# Patient Record
Sex: FEMALE | Race: WHITE | NOT HISPANIC OR LATINO | Employment: UNEMPLOYED | URBAN - METROPOLITAN AREA
[De-identification: names, ages, dates, MRNs, and addresses within clinical notes are randomized per-mention and may not be internally consistent; named-entity substitution may affect disease eponyms.]

---

## 2020-07-07 ENCOUNTER — PREP FOR SURGERY (OUTPATIENT)
Dept: OTHER | Facility: HOSPITAL | Age: 40
End: 2020-07-07

## 2020-07-07 ENCOUNTER — OFFICE VISIT (OUTPATIENT)
Dept: OBSTETRICS AND GYNECOLOGY | Age: 40
End: 2020-07-07

## 2020-07-07 VITALS
WEIGHT: 147 LBS | SYSTOLIC BLOOD PRESSURE: 106 MMHG | HEIGHT: 69 IN | DIASTOLIC BLOOD PRESSURE: 64 MMHG | BODY MASS INDEX: 21.77 KG/M2

## 2020-07-07 DIAGNOSIS — Z98.891 H/O: C-SECTION: Primary | ICD-10-CM

## 2020-07-07 DIAGNOSIS — Z01.419 ENCOUNTER FOR GYNECOLOGICAL EXAMINATION: Primary | ICD-10-CM

## 2020-07-07 PROCEDURE — 99385 PREV VISIT NEW AGE 18-39: CPT | Performed by: OBSTETRICS & GYNECOLOGY

## 2020-07-07 RX ORDER — OXYTOCIN-SODIUM CHLORIDE 0.9% IV SOLN 30 UNIT/500ML 30-0.9/5 UT/ML-%
125 SOLUTION INTRAVENOUS CONTINUOUS PRN
Status: CANCELLED | OUTPATIENT
Start: 2020-07-07

## 2020-07-07 RX ORDER — SODIUM CHLORIDE 0.9 % (FLUSH) 0.9 %
3 SYRINGE (ML) INJECTION EVERY 12 HOURS SCHEDULED
Status: CANCELLED | OUTPATIENT
Start: 2020-07-07

## 2020-07-07 RX ORDER — CARBOPROST TROMETHAMINE 250 UG/ML
250 INJECTION, SOLUTION INTRAMUSCULAR AS NEEDED
Status: CANCELLED | OUTPATIENT
Start: 2020-07-07

## 2020-07-07 RX ORDER — OXYTOCIN-SODIUM CHLORIDE 0.9% IV SOLN 30 UNIT/500ML 30-0.9/5 UT/ML-%
250 SOLUTION INTRAVENOUS CONTINUOUS
Status: CANCELLED | OUTPATIENT
Start: 2020-07-07 | End: 2020-07-07

## 2020-07-07 RX ORDER — LIDOCAINE HYDROCHLORIDE 10 MG/ML
5 INJECTION, SOLUTION EPIDURAL; INFILTRATION; INTRACAUDAL; PERINEURAL AS NEEDED
Status: CANCELLED | OUTPATIENT
Start: 2020-07-07

## 2020-07-07 RX ORDER — SODIUM CHLORIDE, SODIUM LACTATE, POTASSIUM CHLORIDE, CALCIUM CHLORIDE 600; 310; 30; 20 MG/100ML; MG/100ML; MG/100ML; MG/100ML
125 INJECTION, SOLUTION INTRAVENOUS CONTINUOUS
Status: CANCELLED | OUTPATIENT
Start: 2020-07-07

## 2020-07-07 RX ORDER — ACETAMINOPHEN 325 MG/1
650 TABLET ORAL EVERY 4 HOURS PRN
Status: CANCELLED | OUTPATIENT
Start: 2020-07-07

## 2020-07-07 RX ORDER — OXYTOCIN-SODIUM CHLORIDE 0.9% IV SOLN 30 UNIT/500ML 30-0.9/5 UT/ML-%
999 SOLUTION INTRAVENOUS ONCE
Status: CANCELLED | OUTPATIENT
Start: 2020-07-07 | End: 2020-07-07

## 2020-07-07 RX ORDER — CETIRIZINE HYDROCHLORIDE 10 MG/1
10 TABLET ORAL DAILY
COMMUNITY

## 2020-07-07 RX ORDER — ONDANSETRON 2 MG/ML
4 INJECTION INTRAMUSCULAR; INTRAVENOUS EVERY 6 HOURS PRN
Status: CANCELLED | OUTPATIENT
Start: 2020-07-07

## 2020-07-07 RX ORDER — MORPHINE SULFATE 2 MG/ML
2 INJECTION, SOLUTION INTRAMUSCULAR; INTRAVENOUS
Status: CANCELLED | OUTPATIENT
Start: 2020-07-07

## 2020-07-07 RX ORDER — ONDANSETRON 4 MG/1
4 TABLET, FILM COATED ORAL EVERY 6 HOURS PRN
Status: CANCELLED | OUTPATIENT
Start: 2020-07-07

## 2020-07-07 RX ORDER — SODIUM CHLORIDE 0.9 % (FLUSH) 0.9 %
1-10 SYRINGE (ML) INJECTION AS NEEDED
Status: CANCELLED | OUTPATIENT
Start: 2020-07-07

## 2020-07-07 RX ORDER — METHYLERGONOVINE MALEATE 0.2 MG/ML
200 INJECTION INTRAVENOUS AS NEEDED
Status: CANCELLED | OUTPATIENT
Start: 2020-07-07

## 2020-07-07 RX ORDER — IBUPROFEN 600 MG/1
600 TABLET ORAL EVERY 6 HOURS PRN
Status: CANCELLED | OUTPATIENT
Start: 2020-07-07

## 2020-07-07 NOTE — PROGRESS NOTES
"Subjective     Chief Complaint   Patient presents with   • Gynecologic Exam     New gyn: re-estab.care,last seen here ,last pap ,no mammogram         History of Present Illness      Christine Rivas is a very pleasant  39 y.o. female who presents for annual exam.  Mammo Exam age 42, Contraception 's had a vasectomy, Exercise 7 times a week  Patient wants to reestablish care has not been in for 5 years.  She overall is doing well no medical or surgical concerns.  They are done having children.  Her cycles are regular sometimes heavy but not bothersome.  She is getting her wellness labs with her PCP.      Obstetric History:  OB History    None        Menstrual History:     Patient's last menstrual period was 2020 (exact date).       Sexual History:       Past Medical History:   Diagnosis Date   • Anemia      Past Surgical History:   Procedure Laterality Date   •  SECTION         SOCIAL Hx:      The following portions of the patient's history were reviewed and updated as appropriate: allergies, current medications, past family history, past medical history, past social history, past surgical history and problem list.    Review of Systems        Except as outlined in history of physical illness, patient denies any changes in her GYN, , GI systems.  All other systems reviewed were negative.         Objective   Physical Exam    /64   Ht 175.3 cm (69\")   Wt 66.7 kg (147 lb)   LMP 2020 (Exact Date)   Breastfeeding No   BMI 21.71 kg/m²     General: Patient is alert and oriented and appears overall healthy  Neck: Is supple without thyromegaly, no carotid bruits and no lymphadenopathy  Lungs: Clear bilaterally, no wheezing, rhonchi, or rales.  Respiratory rate is normal  Breast: Even, symmetrical, no lymphadenopathy, no retraction, no masses or cysts  Heart: Regular rate and rhythm are appreciated, no murmurs or rubs are heard  Abdomen: Is soft, without organomegaly, bowel sounds " are positive, there is no                                rebound or guarding and palpation does not produce any discomfort  Back: Nontender without CVA tenderness  Pelvic: External genitalia appear normal and consistent with mature female.  BUS normal                            Vagina is clean dry without discharge and appears adequately estrogenized, no               lesions or masses are present                         Cervix is noninflamed without discharge or lesions.  There is no cervical motion             tenderness.                Uterus is nonenlarged, without tenderness, and no masses or abnormalities are  present               Adnexa are non-enlarged, non tender               Rectal exam reveals adequate sphincter tone and no masses or lesions are                     appreciated on digital rectal examination.      Annual Well Woman Exam  There is no problem list on file for this patient.                Assessment/Plan   Christine was seen today for gynecologic exam.    Diagnoses and all orders for this visit:    Encounter for gynecological examination  -     IGP, Apt HPV,rfx 16 / 18,45    Continue her excellent excellent exercise program as she is a .    Discussed ways to reduce the discomfort and flow of her menstrual cycle.      Discussed today's findings and concerns with patient.  Continue to recommend regular exercise including cardiovascular and resistance training as well as  breast self-exam. Wellness lab, mammography, & pap smear, in accordance with age guidelines.    I have encouraged her to call for today's test results if she has not received them within 10 days.  Patient is advised to call with any change in her condition or with any other questions, otherwise return  for annual examination.

## 2020-07-14 LAB
CYTOLOGIST CVX/VAG CYTO: NORMAL
CYTOLOGY CVX/VAG DOC CYTO: NORMAL
CYTOLOGY CVX/VAG DOC THIN PREP: NORMAL
DX ICD CODE: NORMAL
HIV 1 & 2 AB SER-IMP: NORMAL
HPV I/H RISK 4 DNA CVX QL PROBE+SIG AMP: NEGATIVE
OTHER STN SPEC: NORMAL
STAT OF ADQ CVX/VAG CYTO-IMP: NORMAL

## 2021-07-20 ENCOUNTER — OFFICE VISIT (OUTPATIENT)
Dept: OBSTETRICS AND GYNECOLOGY | Age: 41
End: 2021-07-20

## 2021-07-20 VITALS
SYSTOLIC BLOOD PRESSURE: 108 MMHG | BODY MASS INDEX: 22.07 KG/M2 | DIASTOLIC BLOOD PRESSURE: 64 MMHG | HEIGHT: 69 IN | WEIGHT: 149 LBS

## 2021-07-20 DIAGNOSIS — Z01.419 ENCOUNTER FOR GYNECOLOGICAL EXAMINATION: Primary | ICD-10-CM

## 2021-07-20 PROCEDURE — 99395 PREV VISIT EST AGE 18-39: CPT | Performed by: OBSTETRICS & GYNECOLOGY

## 2021-07-20 RX ORDER — DIAPER,BRIEF,INFANT-TODD,DISP
EACH MISCELLANEOUS SEE ADMIN INSTRUCTIONS
COMMUNITY
Start: 2021-04-23

## 2021-07-20 RX ORDER — CHLORPHENIRAMINE MALEATE 4 MG/1
TABLET ORAL
COMMUNITY

## 2021-07-20 RX ORDER — WHEAT DEXTRIN 3 G/3.8 G
POWDER (GRAM) ORAL
COMMUNITY

## 2021-07-20 NOTE — PROGRESS NOTES
"Subjective     Chief Complaint   Patient presents with   • Gynecologic Exam     Annual:Last pap          History of Present Illness      Christine Rivas is a very pleasant  40 y.o. female who presents for annual exam.  Mammo Exam age 42, Contraception 's had a vasectomy, Exercise 7 times a week patient is a     Patient without gynecological concerns or complaints, she had a Pap smear last year, her 2 children are doing well they are going to [employer].  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      Obstetric History:  OB History    No obstetric history on file.        Menstrual History:     Patient's last menstrual period was 2021 (exact date).       Sexual History:       Past Medical History:   Diagnosis Date   • Anemia      Past Surgical History:   Procedure Laterality Date   •  SECTION         SOCIAL Hx:      The following portions of the patient's history were reviewed and updated as appropriate: allergies, current medications, past family history, past medical history, past social history, past surgical history and problem list.    Review of Systems        Except as outlined in history of physical illness, patient denies any changes in her GYN, , GI systems.  All other systems reviewed were negative.         Current Outpatient Medications:   •  cetirizine (zyrTEC) 10 MG tablet, Take 10 mg by mouth Daily., Disp: , Rfl:   •  chlorpheniramine (CHLOR-TRIMETON) 4 MG tablet, Take  by mouth., Disp: , Rfl:   •  hydrocortisone 1 % cream, Apply  topically to the appropriate area as directed See Admin Instructions. Apply topically to the affected area twice daily, Disp: , Rfl:   •  NON FORMULARY, Chaste berry ., Disp: , Rfl:   •  Wheat Dextrin (Benefiber) powder, Take  by mouth., Disp: , Rfl:    Objective   Physical Exam    /64   Ht 175.3 cm (69\")   Wt 67.6 kg (149 lb)   LMP " 07/07/2021 (Exact Date)   Breastfeeding No   BMI 22.00 kg/m²     General: Patient is alert and oriented and appears overall healthy  Neck: Is supple without thyromegaly, no carotid bruits and no lymphadenopathy  Lungs: Clear bilaterally, no wheezing, rhonchi, or rales.  Respiratory rate is normal  Breast: Even, symmetrical, no lymphadenopathy, no retraction, no masses or cysts  Heart: Regular rate and rhythm are appreciated, no murmurs or rubs are heard  Abdomen: Is soft, without organomegaly, bowel sounds are positive, there is no                                rebound or guarding and palpation does not produce any discomfort  Back: Nontender without CVA tenderness  Pelvic: External genitalia appear normal and consistent with mature female.  BUS normal                            Vagina is clean dry without discharge and appears adequately estrogenized, no               lesions or masses are present                         Cervix is noninflamed without discharge or lesions.  There is no cervical motion             tenderness.                Uterus is nonenlarged, without tenderness, and no masses or abnormalities are  present               Adnexa are non-enlarged, non tender               Rectal exam reveals adequate sphincter tone and no masses or lesions are                     appreciated on digital rectal examination.      Annual Well Woman Exam  There is no problem list on file for this patient.                Assessment/Plan   Diagnoses and all orders for this visit:    1. Encounter for gynecological examination (Primary)  -     IGP, Apt HPV,rfx 16 / 18,45      Discussed today's findings and concerns with patient.  Continue to recommend regular exercise including cardiovascular and resistance training as well as  breast self-exam. Wellness lab, mammography, & pap smear, in accordance with age guidelines.    I have encouraged her to call for today's test results if she has not received them within 10 days.   Patient is advised to call with any change in her condition or with any other questions, otherwise return  for annual examination.
